# Patient Record
Sex: FEMALE | Race: WHITE | NOT HISPANIC OR LATINO
[De-identification: names, ages, dates, MRNs, and addresses within clinical notes are randomized per-mention and may not be internally consistent; named-entity substitution may affect disease eponyms.]

---

## 2022-03-05 ENCOUNTER — NON-APPOINTMENT (OUTPATIENT)
Age: 67
End: 2022-03-05

## 2022-03-08 PROBLEM — Z00.00 ENCOUNTER FOR PREVENTIVE HEALTH EXAMINATION: Status: ACTIVE | Noted: 2022-03-08

## 2022-03-10 ENCOUNTER — APPOINTMENT (OUTPATIENT)
Dept: ORTHOPEDIC SURGERY | Facility: CLINIC | Age: 67
End: 2022-03-10
Payer: MEDICARE

## 2022-03-10 VITALS — BODY MASS INDEX: 30.82 KG/M2 | HEIGHT: 60 IN | WEIGHT: 157 LBS

## 2022-03-10 VITALS — HEART RATE: 83 BPM | SYSTOLIC BLOOD PRESSURE: 151 MMHG | DIASTOLIC BLOOD PRESSURE: 87 MMHG | OXYGEN SATURATION: 97 %

## 2022-03-10 DIAGNOSIS — M54.2 CERVICALGIA: ICD-10-CM

## 2022-03-10 DIAGNOSIS — Z98.1 ARTHRODESIS STATUS: ICD-10-CM

## 2022-03-10 PROCEDURE — 99213 OFFICE O/P EST LOW 20 MIN: CPT

## 2022-03-12 ENCOUNTER — TRANSCRIPTION ENCOUNTER (OUTPATIENT)
Age: 67
End: 2022-03-12

## 2022-03-14 PROBLEM — Z98.1 S/P SPINAL FUSION: Status: ACTIVE | Noted: 2022-03-08

## 2022-03-14 PROBLEM — M54.2 CERVICALGIA: Status: ACTIVE | Noted: 2022-03-14

## 2022-03-15 NOTE — HISTORY OF PRESENT ILLNESS
[de-identified] : Pt presents with neck pain.  She had revision lumbar spinal fusion approximately 3 years 3 months ago by Dr. Schwab.  She had cervical fusions in 2001 and 2002.  Around Thanksgiving 2021 she had more neck pain radiating to trapezium with stiffness and decreased mobility.  Position changes aggravate her pain as does neck flexion or extension.  It's difficult to keep her head up.  Uses heat, acupuncture biw x 2 months.   When she was recently on vacation in the warm weather, her neck felt better.   Takes aleve prn pain.

## 2022-03-15 NOTE — DISCUSSION/SUMMARY
[de-identified] : A lengthy discussion was had with the patient regarding her condition.  Rx CT cervical spine.   Discussed possible steroid injections after imaging.  Can review in telehealth.  \par The patient's questions were sought and answered satisfactorily.\par

## 2022-03-15 NOTE — PHYSICAL EXAM
[de-identified] : NVI, except right interossei 4/5 (chronic as per pt); 4/5 R triceps (chronic as well as per pt).  Pain with cervical flexion, left greater than right. [de-identified] : Scoliosis ap/lateral xray 3/10/2022:  instrumentation intact, no halos about the screws.  Retrolisthesis L1-L2.  Advanced junctional arthrosis above cervical fusion.

## 2022-03-31 DIAGNOSIS — M47.812 SPONDYLOSIS W/OUT MYELOPATHY OR RADICULOPATHY, CERVICAL REGION: ICD-10-CM

## 2023-12-05 ENCOUNTER — NON-APPOINTMENT (OUTPATIENT)
Age: 68
End: 2023-12-05

## 2024-01-18 ENCOUNTER — APPOINTMENT (OUTPATIENT)
Dept: ORTHOPEDIC SURGERY | Facility: CLINIC | Age: 69
End: 2024-01-18
Payer: MEDICARE

## 2024-01-18 VITALS
HEART RATE: 202 BPM | BODY MASS INDEX: 30.82 KG/M2 | SYSTOLIC BLOOD PRESSURE: 179 MMHG | HEIGHT: 60 IN | WEIGHT: 157 LBS | OXYGEN SATURATION: 82 % | DIASTOLIC BLOOD PRESSURE: 85 MMHG

## 2024-01-18 PROCEDURE — 99215 OFFICE O/P EST HI 40 MIN: CPT

## 2024-01-18 PROCEDURE — 72082 X-RAY EXAM ENTIRE SPI 2/3 VW: CPT

## 2024-02-14 NOTE — DISCUSSION/SUMMARY
[de-identified] : A lengthy discussion was had with the patient regarding her condition.   Continue with regular exercise and walking program.   She can f/u with this office via telehealth. The patient's questions were sought and answered satisfactorily.  I, Brittany Maxwell NP am acting as a scribe for Dr. Frank Schwab.

## 2024-02-14 NOTE — HISTORY OF PRESENT ILLNESS
[de-identified] : Pt is approximately 5 years s/p revision spinal fusion.  She is moving to [place] and would like to find orthopedic care there.  Her low back aches in the morning, after 30 minutes it improves.  She is active, but had foot sx in 7/2023 which slowed her down.  She is walking more regularly now.  Takes no pain meds.

## 2024-02-14 NOTE — PHYSICAL EXAM
[de-identified] : NVI.  Well healed incision. [de-identified] : Scoliosis xray 1/18/2024:  Instrumentation intact, no halos about the screws.